# Patient Record
Sex: FEMALE | Race: WHITE | NOT HISPANIC OR LATINO | URBAN - METROPOLITAN AREA
[De-identification: names, ages, dates, MRNs, and addresses within clinical notes are randomized per-mention and may not be internally consistent; named-entity substitution may affect disease eponyms.]

---

## 2022-09-09 ENCOUNTER — EMERGENCY (EMERGENCY)
Facility: HOSPITAL | Age: 32
LOS: 1 days | Discharge: HOME | End: 2022-09-09
Attending: EMERGENCY MEDICINE | Admitting: EMERGENCY MEDICINE

## 2022-09-09 VITALS
RESPIRATION RATE: 16 BRPM | HEART RATE: 73 BPM | DIASTOLIC BLOOD PRESSURE: 80 MMHG | WEIGHT: 119.93 LBS | SYSTOLIC BLOOD PRESSURE: 123 MMHG | TEMPERATURE: 99 F | OXYGEN SATURATION: 99 %

## 2022-09-09 PROCEDURE — 99283 EMERGENCY DEPT VISIT LOW MDM: CPT

## 2022-09-09 RX ORDER — CEPHALEXIN 500 MG
1 CAPSULE ORAL
Qty: 20 | Refills: 0
Start: 2022-09-09 | End: 2022-09-13

## 2022-09-09 RX ORDER — CEPHALEXIN 500 MG
500 CAPSULE ORAL ONCE
Refills: 0 | Status: COMPLETED | OUTPATIENT
Start: 2022-09-09 | End: 2022-09-09

## 2022-09-09 RX ADMIN — Medication 500 MILLIGRAM(S): at 09:00

## 2022-09-09 NOTE — ED PROVIDER NOTE - PHYSICAL EXAMINATION
Vitals: I have reviewed the patients vital signs  General: Well dressed, well appearing, no acute distress  HEENT: Atraumatic, normocephalic, airway patent  Eyes: EOMI, tracking appropriately  Neck: no tracheal deviation, no JVD  Chest/Lungs: no trauma, symmetric chest rise, speaking in complete sentences, no WOB  Heart: skin and extremities well perfused, regular rate and rhythm, no LE edema  Neuro: A+Ox3, ambulating without difficulty, CN grossly intact  MSK: strength at baseline in all extremities, no muscle wasting or atrophy, no calf tenderness  Skin: right upper inner thigh with poorly demarcated area of redness warmth and swelling appx 5cm by 8cm which contains one punctate area that is consistent with a puncture/bug bite with a firm indurated 2x4cm area immediately around it. No other rash

## 2022-09-09 NOTE — ED PROVIDER NOTE - PATIENT PORTAL LINK FT
You can access the FollowMyHealth Patient Portal offered by [facility] by registering at the following website: [email or web address]. By joining [facility]’s FollowMyHealth portal, you will also be able to view your health information using other applications (apps) compatible with our system.

## 2022-09-09 NOTE — ED PROVIDER NOTE - NS ED ROS FT
Constitutional: (-) fever (-) vomiting  Eyes/ENT: (-) vision changes, (-) hearing changes  Cardiovascular: (-) chest pain, (-) wheezing  Respiratory: (-) cough, (-) shortness of breath  Gastrointestinal: (-) vomiting, (-) diarrhea, (-) abdominal pain  : (-) dysuria   Musculoskeletal: (-) back pain  Integumentary: (+) rash, (-) edema  Neurological: (-)loc  Allergic/Immunologic: (+) pruritus  Endocrine: No history of thyroid disease

## 2022-09-09 NOTE — ED PROVIDER NOTE - OBJECTIVE STATEMENT
33 y/o F no pmh no meds here now with RLE rash, itchy, 3-4 days duration, concern because increasing in size. Location upper inner thigh. Concerned about a spot she things is a bug btie. Taking benadryl for itching and using a "allergy cream" with minimal relief. No fevers. No pain in rest of leg. Able to ambulate without difficulty. 31 y/o F no pmh no meds here now with RLE rash, itchy, 3-4 days duration, concern because increasing in size. Location upper inner thigh. Concerned about a spot she things is a bug bite. Taking benadryl for itching and using a "allergy cream" with minimal relief. No fevers. No pain in rest of leg. Able to ambulate without difficulty.

## 2022-09-09 NOTE — ED PROVIDER NOTE - ATTENDING CONTRIBUTION TO CARE
Patient is a 32-year-old female with no past medical history who presents with redness and rash to the right lower extremity.  Patient reports an insect bite to the right inner thigh approximately 4 days ago.  Initially area was small and erythematous but over the past 3 days the area of redness has gotten larger and now she notes streaking up the right groin.  Patient reports intense itching for which she has been taking Benadryl.  Denies any fevers chills or other symptoms.  Review of systems as above  Agree with physical exam as above  Assessment and plan: Mild cellulitis versus allergic reaction to insect bite.  Area of redness outlined.  Patient started on antibiotics.  Patient instructed to follow-up with their primary care doctor in 3 to 4 days.  Strict emergent return precautions given to patient.

## 2022-09-09 NOTE — ED PROVIDER NOTE - CLINICAL SUMMARY MEDICAL DECISION MAKING FREE TEXT BOX
Patient with rash c/w either developing cellulitis (induration, warmth) vs allergic/contact (pruritis). No systemic sx. No s/sx of DVT. Will send on keflex, demarcate site, strict return precautions.

## 2022-09-09 NOTE — ED PROVIDER NOTE - NSFOLLOWUPINSTRUCTIONS_ED_ALL_ED_FT
You were evaluated for a rash.    We sent KEFLEX/CEPHALEXIN to the pharmacy to treat an infection. Please complete the entire course    You should see improvement within 48-72 hours, if you do not, or you notice the size increasing, notice pus leaking out, have severe pain, leg swelling, chest pain, difficulty breathing please return to the ER.     You can continue to take benadryl to help with itching    Follow up with your PCP    -If you do not have a PMD, please call [phone] to find one convenient for you or call our clinic at [phone].

## 2022-09-12 DIAGNOSIS — R21 RASH AND OTHER NONSPECIFIC SKIN ERUPTION: ICD-10-CM

## 2024-11-07 NOTE — ED ADULT NURSE NOTE - NS ED NURSE RECORD ANOTHER VITAL SIGN
[employer] message sent to patient. Confirmed prior to sending that patient utilizes this feature.     Yes